# Patient Record
Sex: FEMALE | URBAN - METROPOLITAN AREA
[De-identification: names, ages, dates, MRNs, and addresses within clinical notes are randomized per-mention and may not be internally consistent; named-entity substitution may affect disease eponyms.]

---

## 2022-03-26 ENCOUNTER — APPOINTMENT (RX ONLY)
Dept: URBAN - METROPOLITAN AREA CLINIC 55 | Facility: CLINIC | Age: 47
Setting detail: DERMATOLOGY
End: 2022-03-26

## 2022-03-26 DIAGNOSIS — Z41.9 ENCOUNTER FOR PROCEDURE FOR PURPOSES OTHER THAN REMEDYING HEALTH STATE, UNSPECIFIED: ICD-10-CM

## 2022-03-26 PROCEDURE — ? COSMETIC CONSULTATION: GENERAL

## 2022-04-15 ENCOUNTER — APPOINTMENT (RX ONLY)
Dept: URBAN - METROPOLITAN AREA CLINIC 55 | Facility: CLINIC | Age: 47
Setting detail: DERMATOLOGY
End: 2022-04-15

## 2022-04-15 DIAGNOSIS — Z41.9 ENCOUNTER FOR PROCEDURE FOR PURPOSES OTHER THAN REMEDYING HEALTH STATE, UNSPECIFIED: ICD-10-CM

## 2022-04-15 PROCEDURE — ? BOTOX

## 2022-04-15 NOTE — PROCEDURE: BOTOX
Right Periorbital Skin Units: 0
Show Ucl Units: No
Show Anterior Platysmal Band Units: Yes
Lot #: [id number]
Periorbital Skin Units: 12
Detail Level: Zone
Dilution (U/0.1 Cc): 4
Consent: Written consent was obtained.
Forehead Units: 5
Glabellar Complex Units: 16
Expiration Date (Month Year): 3/24
Post-Care Instructions: After care instructions were provided to the patient.
Additional Area 1 Location: Upper lip

## 2022-08-01 ENCOUNTER — APPOINTMENT (RX ONLY)
Dept: URBAN - METROPOLITAN AREA CLINIC 55 | Facility: CLINIC | Age: 47
Setting detail: DERMATOLOGY
End: 2022-08-01

## 2022-08-01 DIAGNOSIS — Z41.9 ENCOUNTER FOR PROCEDURE FOR PURPOSES OTHER THAN REMEDYING HEALTH STATE, UNSPECIFIED: ICD-10-CM

## 2022-08-01 PROCEDURE — ? BOTOX

## 2022-11-04 ENCOUNTER — APPOINTMENT (RX ONLY)
Dept: URBAN - METROPOLITAN AREA CLINIC 55 | Facility: CLINIC | Age: 47
Setting detail: DERMATOLOGY
End: 2022-11-04

## 2022-11-04 DIAGNOSIS — Z41.9 ENCOUNTER FOR PROCEDURE FOR PURPOSES OTHER THAN REMEDYING HEALTH STATE, UNSPECIFIED: ICD-10-CM

## 2022-11-04 PROCEDURE — ? INVENTORY

## 2022-11-04 PROCEDURE — ? COSMETIC CONSULTATION: FILLERS

## 2022-11-04 PROCEDURE — ? BOTOX

## 2022-11-04 NOTE — PROCEDURE: BOTOX
Show Right And Left Brow Units: No
Mentalis Units: 0
Show Topical Anesthesia: Yes
Forehead Units: 5
Glabellar Complex Units: 16
Detail Level: Detailed
Show Inventory Tab: Show
Consent was obtained.
Post-Care Instructions: Patient instructed to not lie down for 4 hours and limit physical activity for 24 hours.
Periorbital Skin Units: 12
Dilution (U/0.1 Cc): 4

## 2022-11-18 ENCOUNTER — APPOINTMENT (RX ONLY)
Dept: URBAN - METROPOLITAN AREA CLINIC 55 | Facility: CLINIC | Age: 47
Setting detail: DERMATOLOGY
End: 2022-11-18

## 2022-11-18 DIAGNOSIS — Z41.9 ENCOUNTER FOR PROCEDURE FOR PURPOSES OTHER THAN REMEDYING HEALTH STATE, UNSPECIFIED: ICD-10-CM

## 2022-11-18 PROCEDURE — ? SCULPTRA

## 2022-11-18 NOTE — PROCEDURE: SCULPTRA
Right Cheek Filler Volume In Cc: 0
Show Right And Left Jawline Volume?: No
Detail Level: Detailed
Consent: Written consent obtained.
Show Second Additional Location?: Yes
Post-Care Instructions: After care instructions were provided to the patient.
Show Inventory Tab: Show
Anesthesia Type: 1% lidocaine with epinephrine
Vials Reconstituted (Required For Inventory): 1
Dilution Method: The Sculptra was reconstituted with 8 ml of sterile water and 2cc 2% plain lidocaine for each vial.
Injection Technique: The Sculptra was injected to the areas shown in black with a 25g cannula after prepping the skin with alcohol and /or chlorhexidine and providing appropriate anesthesia.
Anesthesia Volume In Cc: 0.5

## 2023-04-21 ENCOUNTER — APPOINTMENT (RX ONLY)
Dept: URBAN - METROPOLITAN AREA CLINIC 55 | Facility: CLINIC | Age: 48
Setting detail: DERMATOLOGY
End: 2023-04-21

## 2023-04-21 DIAGNOSIS — Z41.9 ENCOUNTER FOR PROCEDURE FOR PURPOSES OTHER THAN REMEDYING HEALTH STATE, UNSPECIFIED: ICD-10-CM

## 2023-04-21 PROCEDURE — ? SCULPTRA

## 2023-04-21 PROCEDURE — ? BOTOX

## 2023-04-21 NOTE — PROCEDURE: BOTOX
Forehead Units: 0
Show Levator Superior Units: Yes
Show Inventory Tab: Show
Glabellar Complex Units: 16
Forehead Units: 5
Show Ucl Units: No
Periorbital Skin Units: 12
Consent was obtained.
Dilution (U/0.1 Cc): 4
Post-Care Instructions: Patient instructed to not lie down for 4 hours and limit physical activity for 24 hours.
Incrementing Botox Units: By 0.5 Units
Detail Level: Detailed

## 2023-04-21 NOTE — PROCEDURE: SCULPTRA
Additional Area 3 Volume In Cc: 0
Show Tear Troughs Volume?: Yes
Anesthesia Volume In Cc: 0.5
Show Right And Left Lateral Face Volume?: No
Dilution Method: The Sculptra was reconstituted with 8 ml of sterile water and 2cc 2% plain lidocaine for each vial.
Treatment Number: 2
Injection Technique: The Sculptra was injected to the areas shown in black with a 25g cannula after prepping the skin with alcohol and /or chlorhexidine and providing appropriate anesthesia.
Detail Level: Detailed
Consent: Written consent obtained.
Vials Reconstituted (Required For Inventory): 1
Anesthesia Type: 1% lidocaine with epinephrine
Post-Care Instructions: After care instructions were provided to the patient.
Show Inventory Tab: Show

## 2023-06-02 ENCOUNTER — APPOINTMENT (RX ONLY)
Dept: URBAN - METROPOLITAN AREA CLINIC 55 | Facility: CLINIC | Age: 48
Setting detail: DERMATOLOGY
End: 2023-06-02

## 2023-06-02 DIAGNOSIS — Z41.9 ENCOUNTER FOR PROCEDURE FOR PURPOSES OTHER THAN REMEDYING HEALTH STATE, UNSPECIFIED: ICD-10-CM

## 2023-06-02 PROCEDURE — ? SCULPTRA

## 2023-06-02 NOTE — PROCEDURE: SCULPTRA
Show First Additional Location?: Yes
Show Right And Left Lateral Face Volume?: No
Right Cheek Filler Volume In Cc: 0
Vials Reconstituted (Required For Inventory): 1
Anesthesia Type: 1% lidocaine with epinephrine
Show Inventory Tab: Show
Dilution Method: The Sculptra was reconstituted with 8 ml of sterile water and 2cc 2% plain lidocaine for each vial.
Injection Technique: The Sculptra was injected to the areas shown in black with a 25g cannula after prepping the skin with alcohol and /or chlorhexidine and providing appropriate anesthesia.
Anesthesia Volume In Cc: 0.5
Consent: Written consent obtained.
Treatment Number: 3
Post-Care Instructions: After care instructions were provided to the patient.
Detail Level: Detailed

## 2023-08-23 ENCOUNTER — APPOINTMENT (RX ONLY)
Dept: URBAN - METROPOLITAN AREA CLINIC 55 | Facility: CLINIC | Age: 48
Setting detail: DERMATOLOGY
End: 2023-08-23

## 2023-08-23 DIAGNOSIS — Z41.9 ENCOUNTER FOR PROCEDURE FOR PURPOSES OTHER THAN REMEDYING HEALTH STATE, UNSPECIFIED: ICD-10-CM

## 2023-08-23 PROCEDURE — ? BOTOX

## 2023-08-23 NOTE — PROCEDURE: BOTOX
Periorbital Skin Units: 0
Dilution (U/0.1 Cc): 4
Show Lcl Units: No
Post-Care Instructions: Patient instructed to not lie down for 4 hours and limit physical activity for 24 hours.
Show Additional Area 2: Yes
Incrementing Botox Units: By 0.5 Units
Detail Level: Detailed
Glabellar Complex Units: 16
Forehead Units: 5
Show Inventory Tab: Show
Consent was obtained.
Periorbital Skin Units: 12

## 2023-10-05 ENCOUNTER — APPOINTMENT (RX ONLY)
Dept: URBAN - METROPOLITAN AREA CLINIC 55 | Facility: CLINIC | Age: 48
Setting detail: DERMATOLOGY
End: 2023-10-05

## 2023-10-05 DIAGNOSIS — Z41.9 ENCOUNTER FOR PROCEDURE FOR PURPOSES OTHER THAN REMEDYING HEALTH STATE, UNSPECIFIED: ICD-10-CM

## 2023-10-05 PROCEDURE — ? SCULPTRA

## 2023-10-05 NOTE — PROCEDURE: SCULPTRA
Consent: Written consent obtained.
Show Fifth Additional Location?: Yes
Left Cheek Filler Volume In Cc: 0
Show Right And Left Middle Malar Volume?: No
Post-Care Instructions: After care instructions were provided to the patient.
Treatment Number: 4
Anesthesia Type: 1% lidocaine with epinephrine
Dilution Method: The Sculptra was reconstituted with 8 ml of sterile water and 2cc 2% plain lidocaine for each vial.
Anesthesia Volume In Cc: 0.5
Detail Level: Detailed
Injection Technique: The Sculptra was injected to the areas shown in black with a 25g cannula after prepping the skin with alcohol and /or chlorhexidine and providing appropriate anesthesia.
Show Inventory Tab: Show
Vials Reconstituted (Required For Inventory): 1

## 2023-12-05 ENCOUNTER — APPOINTMENT (RX ONLY)
Dept: URBAN - METROPOLITAN AREA CLINIC 55 | Facility: CLINIC | Age: 48
Setting detail: DERMATOLOGY
End: 2023-12-05

## 2023-12-05 DIAGNOSIS — Z41.9 ENCOUNTER FOR PROCEDURE FOR PURPOSES OTHER THAN REMEDYING HEALTH STATE, UNSPECIFIED: ICD-10-CM

## 2023-12-05 PROCEDURE — ? SCULPTRA

## 2023-12-05 NOTE — PROCEDURE: SCULPTRA
Show Right And Left Tear Trough Volume?: No
Forehead Sculptra Filler Volume In Cc: 0
Show Local Anesthesia?: Yes
Dilution Method: The Sculptra was reconstituted with 8 ml of sterile water and 2cc 2% plain lidocaine for each vial.
Detail Level: Detailed
Injection Technique: The Sculptra was injected to the areas shown in black with a 25g cannula after prepping the skin with alcohol and /or chlorhexidine and providing appropriate anesthesia.
Anesthesia Volume In Cc: 0.5
Vials Reconstituted (Required For Inventory): 1
Consent: Written consent obtained.
Show Inventory Tab: Show
Anesthesia Type: 1% lidocaine with epinephrine
Post-Care Instructions: After care instructions were provided to the patient.

## 2023-12-14 ENCOUNTER — APPOINTMENT (RX ONLY)
Dept: URBAN - METROPOLITAN AREA CLINIC 55 | Facility: CLINIC | Age: 48
Setting detail: DERMATOLOGY
End: 2023-12-14

## 2023-12-14 DIAGNOSIS — Z41.9 ENCOUNTER FOR PROCEDURE FOR PURPOSES OTHER THAN REMEDYING HEALTH STATE, UNSPECIFIED: ICD-10-CM

## 2023-12-14 PROCEDURE — ? BOTOX

## 2023-12-14 NOTE — PROCEDURE: BOTOX
L Brow Units: 0
Show Lateral Platysmal Band Units: Yes
Periorbital Skin Units: 12
Show Ucl Units: No
Consent was obtained.
Incrementing Botox Units: By 0.5 Units
Dilution (U/0.1 Cc): 4
Post-Care Instructions: Patient instructed to not lie down for 4 hours and limit physical activity for 24 hours.
Detail Level: Detailed
Forehead Units: 5
Show Inventory Tab: Show
Glabellar Complex Units: 16

## 2024-03-06 ENCOUNTER — RX ONLY (OUTPATIENT)
Age: 49
Setting detail: RX ONLY
End: 2024-03-06

## 2024-03-06 ENCOUNTER — APPOINTMENT (RX ONLY)
Dept: URBAN - METROPOLITAN AREA CLINIC 55 | Facility: CLINIC | Age: 49
Setting detail: DERMATOLOGY
End: 2024-03-06

## 2024-03-06 DIAGNOSIS — Z41.9 ENCOUNTER FOR PROCEDURE FOR PURPOSES OTHER THAN REMEDYING HEALTH STATE, UNSPECIFIED: ICD-10-CM

## 2024-03-06 PROCEDURE — ? INVENTORY

## 2024-03-06 PROCEDURE — ? SCULPTRA

## 2024-03-06 RX ORDER — TRETINOIN 0.5 MG/G
CREAM TOPICAL
Qty: 40 | Refills: 5 | Status: CANCELLED | COMMUNITY
Start: 2024-03-06

## 2024-03-12 ENCOUNTER — RX ONLY (OUTPATIENT)
Age: 49
Setting detail: RX ONLY
End: 2024-03-12

## 2024-03-12 RX ORDER — TRETIONIN 0.5 MG/G
CREAM TOPICAL
Qty: 20 | Refills: 11 | Status: CANCELLED
Stop reason: SDUPTHER

## 2024-03-12 RX ORDER — TRETIONIN 0.5 MG/G
CREAM TOPICAL
Qty: 20 | Refills: 11 | Status: ERX | COMMUNITY
Start: 2024-03-12

## 2024-03-28 NOTE — PROCEDURE: SCULPTRA
Dilution Method: The Sculptra was reconstituted with 8 ml of sterile water and 2cc 2% plain lidocaine for each vial.
Decollete Sculptra Filler Volume In Cc: 0
Show Jawline Volume?: Yes
Injection Technique: The Sculptra was injected to the areas shown in black with a 25g cannula after prepping the skin with alcohol and /or chlorhexidine and providing appropriate anesthesia.
Anesthesia Volume In Cc: 0.5
Show Right And Left Middle Malar Volume?: No
Consent: Written consent obtained.
Detail Level: Detailed
Post-Care Instructions: After care instructions were provided to the patient.
Show Inventory Tab: Show
Vials Reconstituted (Required For Inventory): 1
Anesthesia Type: 1% lidocaine with epinephrine
0

## 2024-05-09 ENCOUNTER — APPOINTMENT (RX ONLY)
Dept: URBAN - METROPOLITAN AREA CLINIC 55 | Facility: CLINIC | Age: 49
Setting detail: DERMATOLOGY
End: 2024-05-09

## 2024-05-09 DIAGNOSIS — Z41.9 ENCOUNTER FOR PROCEDURE FOR PURPOSES OTHER THAN REMEDYING HEALTH STATE, UNSPECIFIED: ICD-10-CM

## 2024-05-09 PROCEDURE — ? BOTOX

## 2024-05-09 NOTE — PROCEDURE: BOTOX
Show Ucl Units: No
Right Periorbital Skin Units: 0
Show Masseter Units: Yes
Incrementing Botox Units: By 0.5 Units
Consent was obtained.
Post-Care Instructions: Patient instructed to not lie down for 4 hours and limit physical activity for 24 hours.
Detail Level: Detailed
Dilution (U/0.1 Cc): 4
Forehead Units: 5
Glabellar Complex Units: 16
Show Inventory Tab: Show
Periorbital Skin Units: 12

## 2024-09-25 ENCOUNTER — APPOINTMENT (RX ONLY)
Dept: URBAN - METROPOLITAN AREA CLINIC 55 | Facility: CLINIC | Age: 49
Setting detail: DERMATOLOGY
End: 2024-09-25

## 2024-09-25 DIAGNOSIS — Z41.9 ENCOUNTER FOR PROCEDURE FOR PURPOSES OTHER THAN REMEDYING HEALTH STATE, UNSPECIFIED: ICD-10-CM

## 2024-09-25 PROCEDURE — ? BOTOX

## 2024-09-25 NOTE — PROCEDURE: BOTOX
Forehead Units: 0
Periorbital Skin Units: 12
Show Nasal Units: Yes
Detail Level: Detailed
Show Ucl Units: No
Incrementing Botox Units: By 0.5 Units
Forehead Units: 5
Show Inventory Tab: Show
Glabellar Complex Units: 16
Consent was obtained.
Post-Care Instructions: Patient instructed to not lie down for 4 hours and limit physical activity for 24 hours.
Dilution (U/0.1 Cc): 4

## 2025-05-27 ENCOUNTER — APPOINTMENT (OUTPATIENT)
Dept: URBAN - METROPOLITAN AREA CLINIC 55 | Facility: CLINIC | Age: 50
Setting detail: DERMATOLOGY
End: 2025-05-27

## 2025-05-27 DIAGNOSIS — Z41.9 ENCOUNTER FOR PROCEDURE FOR PURPOSES OTHER THAN REMEDYING HEALTH STATE, UNSPECIFIED: ICD-10-CM

## 2025-05-27 PROCEDURE — ? SCULPTRA

## 2025-05-27 PROCEDURE — ? INVENTORY

## 2025-05-27 PROCEDURE — ? FILLERS

## 2025-05-27 PROCEDURE — ? BOTOX

## 2025-05-27 NOTE — PROCEDURE: SCULPTRA
Cheeks Filler Volume In Cc: 0
Show Fifth Additional Location?: Yes
Detail Level: Detailed
Show Right And Left Middle Malar Volume?: No
Vials Reconstituted (Required For Inventory): 1
Anesthesia Type: 1% lidocaine with epinephrine
Dilution Method: The Sculptra was reconstituted with 8 ml of sterile water and 2cc 2% plain lidocaine for each vial.
Show Inventory Tab: Show
Anesthesia Volume In Cc: 0.5
Injection Technique: The Sculptra was injected to the areas shown in black with a 25g cannula after prepping the skin with alcohol and /or chlorhexidine and providing appropriate anesthesia.
Post-Care Instructions: After care instructions were provided to the patient.
Consent: Written consent obtained.
Treatment Number: 7

## 2025-05-27 NOTE — PROCEDURE: BOTOX
Additional Area 1 Units: 0
Show Glabellar Units: Yes
Show Inventory Tab: Show
Glabellar Complex Units: 16
Show Lcl Units: No
Dilution (U/0.1 Cc): 4
Consent was obtained.
Periorbital Skin Units: 12
Post-Care Instructions: Patient instructed to not lie down for 4 hours and limit physical activity for 24 hours.
Incrementing Botox Units: By 0.5 Units
Detail Level: Detailed
Forehead Units: 5

## 2025-05-27 NOTE — PROCEDURE: FILLERS
Brows Filler Volume In Cc: 0
Detail Level: Detailed
Use Map Statement For Sites (Optional): No
Map Statment: See Attach Map for Complete Details
Filler: [facility] 3
Include Cannula Information In Note?: Yes
Anesthesia Type: 1% lidocaine with epinephrine
Include Cannula Size?: 25G
Consent was obtained.
Anesthesia Volume In Cc: 0.5
Post-Care Instructions: After care instructions were provided verbally and in writing.
Include Cannula Size?: 27G